# Patient Record
Sex: FEMALE | Race: WHITE | Employment: FULL TIME | URBAN - METROPOLITAN AREA
[De-identification: names, ages, dates, MRNs, and addresses within clinical notes are randomized per-mention and may not be internally consistent; named-entity substitution may affect disease eponyms.]

---

## 2019-05-10 ENCOUNTER — TRANSFERRED RECORDS (OUTPATIENT)
Dept: HEALTH INFORMATION MANAGEMENT | Facility: CLINIC | Age: 39
End: 2019-05-10

## 2020-01-22 ENCOUNTER — TRANSFERRED RECORDS (OUTPATIENT)
Dept: HEALTH INFORMATION MANAGEMENT | Facility: CLINIC | Age: 40
End: 2020-01-22

## 2020-02-04 ENCOUNTER — TRANSFERRED RECORDS (OUTPATIENT)
Dept: HEALTH INFORMATION MANAGEMENT | Facility: CLINIC | Age: 40
End: 2020-02-04

## 2020-02-24 ENCOUNTER — TRANSFERRED RECORDS (OUTPATIENT)
Dept: HEALTH INFORMATION MANAGEMENT | Facility: CLINIC | Age: 40
End: 2020-02-24

## 2020-03-26 ENCOUNTER — MEDICAL CORRESPONDENCE (OUTPATIENT)
Dept: HEALTH INFORMATION MANAGEMENT | Facility: CLINIC | Age: 40
End: 2020-03-26

## 2020-03-26 ENCOUNTER — TRANSCRIBE ORDERS (OUTPATIENT)
Dept: OTHER | Age: 40
End: 2020-03-26

## 2020-03-26 ENCOUNTER — TRANSFERRED RECORDS (OUTPATIENT)
Dept: HEALTH INFORMATION MANAGEMENT | Facility: CLINIC | Age: 40
End: 2020-03-26

## 2020-03-26 DIAGNOSIS — K75.4 AUTOIMMUNE HEPATITIS (H): Primary | ICD-10-CM

## 2020-04-03 NOTE — TELEPHONE ENCOUNTER
RECORDS RECEIVED FROM: Internal referral    DATE RECEIVED:7/3/20 10AM   NOTES STATUS DETAILS   OFFICE NOTE from referring provider Received  [facility] recs scanned in epic 3/26/20    OFFICE NOTES from other specialists N/A    DISCHARGE SUMMARY from hospital N/A    MEDICATION LIST Received Within OV notes   LIVER BIOSPY (IF APPLICABLE)      PATHOLOGY REPORTS  N/A    IMAGING     ENDOSCOPY (IF AVAILABLE) N/A    COLONOSCOPY (IF AVAILABLE) Received Colonoscopy 1/22/20   ULTRASOUND LIVER Received 5/10/19 - Fax to PN to have image pushed -4/3 8:54AM   CT OF ABDOMEN N/A    MRI OF LIVER N/A    FIBROSCAN, US ELASTOGRAPHY, FIBROSIS SCAN, MR ELASTOGRAPHY N/A    LABS     HEPATIC PANEL (LIVER PANEL) Received 2/24/20     Action 4.6.2020 MJ 7:18AM   Action Taken Received liver BX report from [facility]. Image in PACS.

## 2020-07-02 ENCOUNTER — TELEPHONE (OUTPATIENT)
Dept: GASTROENTEROLOGY | Facility: CLINIC | Age: 40
End: 2020-07-02

## 2020-07-03 ENCOUNTER — PRE VISIT (OUTPATIENT)
Dept: GASTROENTEROLOGY | Facility: CLINIC | Age: 40
End: 2020-07-03

## 2020-07-10 ENCOUNTER — VIRTUAL VISIT (OUTPATIENT)
Dept: GASTROENTEROLOGY | Facility: CLINIC | Age: 40
End: 2020-07-10
Attending: INTERNAL MEDICINE
Payer: COMMERCIAL

## 2020-07-10 DIAGNOSIS — K50.00 CROHN'S DISEASE OF SMALL INTESTINE WITHOUT COMPLICATION (H): ICD-10-CM

## 2020-07-10 DIAGNOSIS — K75.4 AUTOIMMUNE HEPATITIS (H): Primary | ICD-10-CM

## 2020-07-10 RX ORDER — MERCAPTOPURINE 50 MG/1
50 TABLET ORAL
COMMUNITY
Start: 2020-03-10 | End: 2020-08-21

## 2020-07-10 RX ORDER — MESALAMINE 800 MG/1
1600 TABLET, DELAYED RELEASE ORAL
COMMUNITY
Start: 2020-06-09 | End: 2021-06-09

## 2020-07-10 RX ORDER — MULTIPLE VITAMINS W/ MINERALS TAB 9MG-400MCG
1 TAB ORAL DAILY
COMMUNITY

## 2020-07-10 SDOH — HEALTH STABILITY: MENTAL HEALTH: HOW OFTEN DO YOU HAVE A DRINK CONTAINING ALCOHOL?: MONTHLY OR LESS

## 2020-07-10 ASSESSMENT — PAIN SCALES - GENERAL: PAINLEVEL: NO PAIN (0)

## 2020-07-10 NOTE — PROGRESS NOTES
"Angela Gifford is a 39 year old female who is being evaluated via a billable telephone visit.      The patient has been notified of following:     \"This telephone visit will be conducted via a call between you and your physician/provider. We have found that certain health care needs can be provided without the need for a physical exam.  This service lets us provide the care you need with a short phone conversation.  If a prescription is necessary we can send it directly to your pharmacy.  If lab work is needed we can place an order for that and you can then stop by our lab to have the test done at a later time.    Telephone visits are billed at different rates depending on your insurance coverage. During this emergency period, for some insurers they may be billed the same as an in-person visit.  Please reach out to your insurance provider with any questions.    If during the course of the call the physician/provider feels a telephone visit is not appropriate, you will not be charged for this service.\"    Patient has given verbal consent for Telephone visit?  Yes    What phone number would you like to be contacted at? [phone]    How would you like to obtain your AVS? Deedee    Phone call duration: *** minutes    {signature options:[id number]}        "

## 2020-07-10 NOTE — PROGRESS NOTES
"Angela Gifford is a 39 year old female who is being evaluated via a billable telephone visit during the COVID-19 pandemic and clinic response to limit in-person visits.  The patient has been notified of following:   \"This telephone visit will be conducted via a call between you and your physician/provider. We have found that certain health care needs can be provided without the need for a physical exam.  This service lets us provide the care you need with a short phone conversation.  If a prescription is necessary we can send it directly to your pharmacy.  If lab work is needed we can place an order for that and you can then stop by our lab to have the test done at a later time.  If during the course of the call the physician/provider feels a telephone visit is not appropriate, you will not be charged for this service.\" Telephone visits are billed at different rates depending on your insurance coverage. During this emergency period, for some insurers they may be billed the same as an in-person visit.  Please reach out to your insurance provider with any questions.  Consent has been obtained for this service by 1 care team member: yes  I have reviewed and updated the patient's Past Medical History, Social History, Family History and Medication List.    What phone number would you like to be contacted at?     Phone call contact time  Call Started at 8:12  Call Ended at 8:47  On phone patient    REASON FOR CONSULTATION: elevated liver tests  REFERRING PROVIDER: Dr. Mar Vicente    A/P  Angela Gifford is a 39 year old female with Crohns and diagnosis of AIH.    I think there is enough evidence to support the diagnosis of AIH (versus nonspecific inflammation in the face of IBD), but admittedly it is not completely clear given she was on 6MP at the time of her biopsy. She still had inflammation on her liver biopsy and her labs do not persistently normalize.    I would propose the following options  1. Increase " 6MP from 50 to 75  2. Change from 6MP to  bid.    Repeat LFTs ~ 2 weeks after medication change  I have called her gastroenterologist to discuss the medication changes.     Mari Donahue MD  Hepatology/Liver Transplant  Medical Director, Liver Transplantation  [facility]  Subjective  Angela Gifford is a 39 year old female referred for [facility]. She also has Crohn's. She has been seen at [facility] by Dr. Vicente  2/4/20. She is on mercaptupurine and mesalamine.    Liver tests 2852-5511 normal. 7/2018 ALT 71. Up to 192 7/12/19. Variably elevated since then, 2-4x normal. AST mildly (2x normal). AP, TB, protein normal    Liver biopsy 5/10/19 read by Dr. Mancuso  No fibrosis  Hepatitis of uncertain etiology   AST 51 at that time  She was on 6MP at the time    Crohns  Dx 2008, symptoms began 2006  Ileocolonic  Imuran was used and she had N//V  Maintained on pentasa/mesalamine (switched about 3-4 months ago to mesalamine) and 6-mercaptupurine (75 mg). In May 2019, decreased 6 MP 2/2 elevated liver tests to 50 mg. She stopped 6MP for a while and was on prednisone.    Stopping pentasa in the past resulted in arthralgias and aphthous ulcers. Liver enzymes were normal for about 3 months end of 2019. No change in medications at that time. She is currently in remission.     Had a child August 2014. Off 6MP December 2013-March 2015 then Crohns symptoms started (mouth sores, arthralgias)    HCV neg  HBV neg  FANNY 1:160  ASMA pos  AMA neg  Iron panel 2015 normal  US no record    PMH  TMJ  MARY ANNE in past. Hgb normal now 12.7  Crohns as above  Occasional eczema  No abdominal surgery  IUD in place  SHX    No tobacco  ETOH 3 glasses wine/week  SW in youth activities  FHX  No liver disease  M RA  MGM RA  ROS 10 point ROS neg other than the symptoms noted above in the HPI.

## 2020-07-10 NOTE — PROGRESS NOTES
"Angela Gifford is a 39 year old female who is being evaluated via a billable telephone visit during the COVID-19 pandemic and clinic response to limit in-person visits.  The patient has been notified of following:   \"This telephone visit will be conducted via a call between you and your physician/provider. We have found that certain health care needs can be provided without the need for a physical exam.  This service lets us provide the care you need with a short phone conversation.  If a prescription is necessary we can send it directly to your pharmacy.  If lab work is needed we can place an order for that and you can then stop by our lab to have the test done at a later time.  If during the course of the call the physician/provider feels a telephone visit is not appropriate, you will not be charged for this service.\" Telephone visits are billed at different rates depending on your insurance coverage. During this emergency period, for some insurers they may be billed the same as an in-person visit.  Please reach out to your insurance provider with any questions.  Consent has been obtained for this service by 1 care team member: yes  I have reviewed and updated the patient's Past Medical History, Social History, Family History and Medication List.    What phone number would you like to be contacted at?     Phone call contact time  Call Started at 8:12  Call Ended at 8:47  On phone patient      REASON FOR CONSULTATION: elevated liver tests  REFERRING PROVIDER:    A/P  Angela Gifford is a 39 year old female with Crohns and diagnosis of AIH.    I do think there is enough evidence         Mari Donahue MD  Hepatology/Liver Transplant  Medical Director, Liver Transplantation  [facility]  Subjective  Angela Gifford is a 39 year old female referred for AIH. She also has Crohn's. She has been seen at [facility] by Dr. Jules MANLEY 2/4/20. She is on mercaptupurine and mesalamine.    Liver tests " 7203-0647 normal. 7/2018 ALT 71. Up to 192 7/12/19. Variably elevated since then, 2-4x normal. AST mildly (2x normal). AP, TB, protein normal    Liver biopsy 5/10/19 read by Dr. Mancuso  No fibrosis  Hepatitis of uncertain etiology   AST 51 at that time  She was on 6MP at the time    Crohns  Dx 2008, symptoms began 2006  Ileocolonic  Imuran was used and she had N//V  Maintained on pentasa/mesalamine (switched about 3-4 months) and 6-mercaptupurine (75 mg) until May 2019, decreased 2/2 elevated liver tests to 50 mg. She stopped 6MP for a while and was on prednisone  Stopping pentasa resulted in arthralgias and aphthous ulcers. Liver enzymes were normal for about 3 months end of 2019. No change in medications at that time. She is currently in remission.     Had a child August 2014. Off 6MP December 2013-March 2015 then symptoms started (mouth sores, arthralgias)    HCV neg  HBV neg  FANNY 1:160  ASMA pos  AMA neg  Iron panel  US    PMH  TMJ  MARY ANNE  Crohns as above  Occasional eczema  No abdominal surgery  IUD   SHX    No tobacco  ETOH 3 glasses wine/week  SW in youth activities  FHX  No liver disease  M RA  MGM RA  ROS 10 point ROS neg other than the symptoms noted above in the HPI.

## 2020-07-15 ENCOUNTER — TELEPHONE (OUTPATIENT)
Dept: GASTROENTEROLOGY | Facility: CLINIC | Age: 40
End: 2020-07-15

## 2020-07-15 DIAGNOSIS — K75.4 AUTOIMMUNE HEPATITIS (H): Primary | ICD-10-CM

## 2020-07-15 RX ORDER — MYCOPHENOLATE MOFETIL 250 MG/1
750 CAPSULE ORAL 2 TIMES DAILY
Qty: 180 CAPSULE | Refills: 11 | Status: SHIPPED | OUTPATIENT
Start: 2020-07-15 | End: 2020-08-17

## 2020-07-15 NOTE — TELEPHONE ENCOUNTER
Called patient and discussed the following:    Our plan is   1. Stop 6 MP   2. Start mycophenolate mofetil (Cellcept) 750 mg po bid. This is a new prescription for her. I discussed this medication with her on the phone.   3. Labs in 2 weeks (BMP CBC LFTs)   4. See Dr. Vicente as planned in about 2 weeks.   5. Follow up with me in 6-8 weeks.    Pt verbalized understanding and had no further questions.    Guerda AZEEVDO LPN  Hepatology Clinic

## 2020-07-20 ENCOUNTER — TELEPHONE (OUTPATIENT)
Dept: GASTROENTEROLOGY | Facility: CLINIC | Age: 40
End: 2020-07-20

## 2020-08-17 DIAGNOSIS — K75.4 AUTOIMMUNE HEPATITIS (H): ICD-10-CM

## 2020-08-17 RX ORDER — MYCOPHENOLATE MOFETIL 250 MG/1
750 CAPSULE ORAL 2 TIMES DAILY
Qty: 540 CAPSULE | Refills: 3 | Status: SHIPPED | OUTPATIENT
Start: 2020-08-17 | End: 2020-12-15

## 2020-08-21 ENCOUNTER — VIRTUAL VISIT (OUTPATIENT)
Dept: GASTROENTEROLOGY | Facility: CLINIC | Age: 40
End: 2020-08-21
Attending: INTERNAL MEDICINE
Payer: COMMERCIAL

## 2020-08-21 DIAGNOSIS — K75.4 AUTOIMMUNE HEPATITIS (H): ICD-10-CM

## 2020-08-21 ASSESSMENT — PAIN SCALES - GENERAL: PAINLEVEL: NO PAIN (0)

## 2020-08-21 NOTE — PROGRESS NOTES
"Angela Gifford is a 39 year old female who is being evaluated via a billable video visit.      The patient has been notified of following:   \"This video visit will be conducted via a call between you and your physician/provider. We have found that certain health care needs can be provided without the need for an in-person physical exam.  This service lets us provide the care you need with a video conversation.  If a prescription is necessary we can send it directly to your pharmacy.  If lab work is needed we can place an order for that and you can then stop by our lab to have the test done at a later time. Video visits are billed at different rates depending on your insurance coverage.  Please reach out to your insurance provider with any questions.  If during the course of the call the physician/provider feels a video visit is not appropriate, you will not be charged for this service.\"   Patient has given verbal consent for Video visit? Yes      Type of service:  Video Visit  Video Start Time: 11:35  Video End Time 11:47  Patient location: home  Will anyone else be joining your video visit?   {If patient encounters technical issues they should call [phone] :[phone]  Distant Location (provider location):  [facility] HEPATOLOGY   Mode of Communication:  Video Conference via [place]  I have reviewed and updated the patient's Past Medical History, Social History, Family History and Medication List.    REASON FOR CONSULTATION: elevated liver tests  REFERRING PROVIDER: Dr. Mar Vicente    A/P  Angela Gifford is a 39 year old female with Crohns and diagnosis of AIH.    I think there is enough evidence to support the diagnosis of AIH (versus nonspecific inflammation in the face of IBD), but admittedly it is not completely clear given she was on 6MP at the time of her biopsy. She still had inflammation on her liver biopsy and her labs do not persistently normalize. Had N/V with Imuran. Switched off " mercaptopurine to MMF about 1 month ago. LFTs currently normal.   Plan is to possibly start Humira per Dr. Vicente.  Eventually I would like to try to reduce the MMF but will wait for things to settle out with her Crohn's medication plans. MMF is not a primary medication for Crohns.  VIt D Stopped vit D supplement   Liver tests will be monitored by Dr. Vicente. Angela knows to contact e if LFTs become abnormal.  RTC 6 months.      Mari Donahue MD  Hepatology/Liver Transplant  Medical Director, Liver Transplantation  [facility]  Subjective  Angela Gifford is a 39 year old female referred for [facility]. She also has Crohn's. She has been seen at [facility] by Dr. Vicente . She was on mercaptupurine and mesalamine.    I talked to Dr. Vicente and we agreed to stop mercaptopurine and start  bid. F. She is tolerating this well. Liver tests are now normal.    Liver tests 1438-6448 normal. 7/2018 ALT 71. Up to 192 7/12/19. Variably elevated since then, 2-4x normal. AST mildly (2x normal). AP, TB, protein normal    Liver biopsy 5/10/19 read by Dr. Mancuso  No fibrosis  Hepatitis of uncertain etiology   AST 51 at that time  She was on 6MP at the time    Crohns  Dx 2008, symptoms began 2006  Ileocolonic  Imuran was used and she had N//V  Maintained on pentasa/mesalamine (switched about 3-4 months ago to mesalamine) and 6-mercaptupurine (75 mg). In May 2019, decreased 6 MP 2/2 elevated liver tests to 50 mg. She stopped 6MP for a while and was on prednisone.    Stopping pentasa in the past resulted in arthralgias and aphthous ulcers. Liver enzymes were normal for about 3 months end of 2019. No change in medications at that time. She is currently in remission.     Had a child August 2014. Off 6MP December 2013-March 2015 then Crohns symptoms started (mouth sores, arthralgias)    HCV neg  HBV neg  FANNY 1:160  ASMA pos  AMA neg  Iron panel 2015 normal  US no record    PMH  TMJ  MARY ANNE in past. Hgb normal  now 12.7  Crohns as above  Occasional eczema  No abdominal surgery  IUD in place  SHX    No tobacco  ETOH 3 glasses wine/week  SW in youth activities  FHX  No liver disease  M RA  MGM RA  ROS 10 point ROS neg other than the symptoms noted above in the HPI.

## 2020-08-21 NOTE — PROGRESS NOTES
"Angela Gifford is a 39 year old female who is being evaluated via a billable video visit.      The patient has been notified of following:     \"This video visit will be conducted via a call between you and your physician/provider. We have found that certain health care needs can be provided without the need for an in-person physical exam.  This service lets us provide the care you need with a video conversation.  If a prescription is necessary we can send it directly to your pharmacy.  If lab work is needed we can place an order for that and you can then stop by our lab to have the test done at a later time.    Video visits are billed at different rates depending on your insurance coverage.  Please reach out to your insurance provider with any questions.    If during the course of the call the physician/provider feels a video visit is not appropriate, you will not be charged for this service.\"    Patient has given verbal consent for Video visit? Yes  How would you like to obtain your AVS? MyChart  If you are dropped from the video visit, the video invite should be resent to: Text to cell phone: [phone]  Will anyone else be joining your video visit? No  {If patient encounters technical issues they should call [phone] :[id number]}      Video-Visit Details    Type of service:  Video Visit    Video Start Time: {video visit start/end time:152948}  Video End Time: {video visit start/end time:152948}    Originating Location (pt. Location): {video visit patient location:843346::\"Home\"}    Distant Location (provider location):  [facility] HEPATOLOGY     Platform used for Video Visit: {Virtual Visit Platforms:515693::\"Beacon Reader\"}    {signature options:381497}        "

## 2020-08-21 NOTE — LETTER
"    8/21/2020         RE: nAgela Gifford  [address] [place] [id number]        Dear Colleague,    Thank you for referring your patient, Angela Gifford, to the [facility] HEPATOLOGY. Please see a copy of my visit note below.    Angela Gifford is a 39 year old female who is being evaluated via a billable video visit.      The patient has been notified of following:   \"This video visit will be conducted via a call between you and your physician/provider. We have found that certain health care needs can be provided without the need for an in-person physical exam.  This service lets us provide the care you need with a video conversation.  If a prescription is necessary we can send it directly to your pharmacy.  If lab work is needed we can place an order for that and you can then stop by our lab to have the test done at a later time. Video visits are billed at different rates depending on your insurance coverage.  Please reach out to your insurance provider with any questions.  If during the course of the call the physician/provider feels a video visit is not appropriate, you will not be charged for this service.\"   Patient has given verbal consent for Video visit? Yes      Type of service:  Video Visit  Video Start Time: 11:35  Video End Time 11:47  Patient location: home  Will anyone else be joining your video visit?   {If patient encounters technical issues they should call [phone] :[phone]  Distant Location (provider location):  [facility] HEPATOLOGY   Mode of Communication:  Video Conference via Cinchcast  I have reviewed and updated the patient's Past Medical History, Social History, Family History and Medication List.    REASON FOR CONSULTATION: elevated liver tests  REFERRING PROVIDER: Dr. Mar Vicente    A/P  Angela Gifford is a 39 year old female with Crohns and diagnosis of AIH.    I think there is enough evidence to support the diagnosis of AIH (versus nonspecific inflammation in the " face of IBD), but admittedly it is not completely clear given she was on 6MP at the time of her biopsy. She still had inflammation on her liver biopsy and her labs do not persistently normalize. Had N/V with Imuran. Switched off mercaptopurine to MMF about 1 month ago. LFTs currently normal.   Plan is to possibly start Humira per Dr. Vicente.  Eventually I would like to try to reduce the MMF but will wait for things to settle out with her Crohn's medication plans. MMF is not a primary medication for Crohns.  VIt D Stopped vit D supplement   Liver tests will be monitored by Dr. Vicente. Angela knows to contact e if LFTs become abnormal.  RTC 6 months.      Mari Donahue MD  Hepatology/Liver Transplant  Medical Director, Liver Transplantation  [facility]  Subjective  Angela Gifford is a 39 year [facility]. She also has Crohn's. She has been seen at [facility] by Dr. Vicente . She was on mercaptupurine and mesalamine.    I talked to Dr. Vicente and we agreed to stop mercaptopurine and start  bid. F. She is tolerating this well. Liver tests are now normal.    Liver tests 8385-8557 normal. 7/2018 ALT 71. Up to 192 7/12/19. Variably elevated since then, 2-4x normal. AST mildly (2x normal). AP, TB, protein normal    Liver biopsy 5/10/19 read by Dr. Mancuso  No fibrosis  Hepatitis of uncertain etiology   AST 51 at that time  She was on 6MP at the time    Crohns  Dx 2008, symptoms began 2006  Ileocolonic  Imuran was used and she had N//V  Maintained on pentasa/mesalamine (switched about 3-4 months ago to mesalamine) and 6-mercaptupurine (75 mg). In May 2019, decreased 6 MP 2/2 elevated liver tests to 50 mg. She stopped 6MP for a while and was on prednisone.    Stopping pentasa in the past resulted in arthralgias and aphthous ulcers. Liver enzymes were normal for about 3 months end of 2019. No change in medications at that time. She is currently in remission.     Had a  child August 2014. Off 6MP December 2013-March 2015 then Crohns symptoms started (mouth sores, arthralgias)    HCV neg  HBV neg  FANNY 1:160  ASMA pos  AMA neg  Iron panel 2015 normal  US no record    PMH  TMJ  MARY ANNE in past. Hgb normal now 12.7  Crohns as above  Occasional eczema  No abdominal surgery  IUD in place  SHX    No tobacco  ETOH 3 glasses wine/week  SW in youth activities  FHX  No liver disease  M RA  MGM RA  ROS 10 point ROS neg other than the symptoms noted above in the HPI.    Again, thank you for allowing me to participate in the care of your patient.        Sincerely,        Mari Donahue MD

## 2020-12-15 DIAGNOSIS — K75.4 AUTOIMMUNE HEPATITIS (H): ICD-10-CM

## 2020-12-15 RX ORDER — MYCOPHENOLATE MOFETIL 250 MG/1
750 CAPSULE ORAL 2 TIMES DAILY
Qty: 180 CAPSULE | Refills: 11 | Status: SHIPPED | OUTPATIENT
Start: 2020-12-15

## 2020-12-15 NOTE — TELEPHONE ENCOUNTER
30 day supply refill sent.  Patient notified.    Guerda AZEVEDO LPN  Hepatology Clinic    [facility] Call Center    Phone Message    May a detailed message be left on voicemail: yes     Reason for Call: Medication Refill Request    Has the patient contacted the pharmacy for the refill? Yes   Name of medication being requested: mycophenolate (GENERIC EQUIVALENT) 250 MG capsule  Provider who prescribed the medication: Dr. Donahue  Pharmacy: [id number] [address] [place] [address] AT [facility] [address]  Date medication is needed: RANJIT Miller calling to request a short supply to  locally due to her mail order pharmacy's shipment not being delivered to her. They are going to send her a new supply. She is currently out of this medication and does not have any to take for her pm dose. Please call Angela to confirm.    Action Taken: Message routed to:  [facility]  HEPATOLOGY    Travel Screening: Not Applicable

## 2021-01-04 ENCOUNTER — HEALTH MAINTENANCE LETTER (OUTPATIENT)
Age: 41
End: 2021-01-04

## 2021-02-12 ENCOUNTER — TELEPHONE (OUTPATIENT)
Dept: GASTROENTEROLOGY | Facility: CLINIC | Age: 41
End: 2021-02-12

## 2021-02-12 DIAGNOSIS — K75.4 AUTOIMMUNE HEPATITIS (H): Primary | ICD-10-CM

## 2021-02-12 NOTE — TELEPHONE ENCOUNTER
Lab orders faxed to [phone]. Pt notified.    Guerda AZEVEDO LPN  Hepatology Clinic       [facility] Call Center    Phone Message    May a detailed message be left on voicemail: yes     Reason for Call: Order(s): Other:   Reason for requested: Lab order for appointment on 2/22/21.  [facility] - [phone]  Date needed: ASAP  Provider name: Dr. Mari Donahue      Action Taken: Message routed to:  [facility] Hepatology    Travel Screening: Not Applicable

## 2021-02-21 NOTE — PROGRESS NOTES
[facility]  LIVER CLINIC FOLLOW UP  VIDEO VISIT    REASON FOR CONSULTATION: elevated liver tests  REFERRING PROVIDER: Dr. Mar Vicente    A/P  Ms. Gifford is a 39 Y F with Crohns and AIH.    I think there is enough evidence to support the diagnosis of AIH (versus nonspecific inflammation in the face of IBD), but admittedly it is not completely clear. She still had inflammation on her liver biopsy and her labs did not persistently normalize. Had N/V with Imuran. Switched off mercaptopurine to MMF July 2020. LFTs normal since then    She is on Humira for Crohn's since September 2020. MMF is not a primary medication for Crohns.  When she sees Dr. Vicente in May, if things are stable, ok to reduce MMF to 500 bid. She should call me then as I would want more LFTs with a dose reduction.   Liver tests will be monitored by Dr. Vicente. Angela knows to contact me if LFTs become abnormal.  RTC 12 months.    Mari Donahue MD  Hepatology/Liver Transplant  Medical Director, Liver Transplantation  [facility]  Subjective  Angela Gifford is a 40 Y F referred for AIH. She also has Crohn's. She has been seen at [facility] by Dr. Vicente.  She was on mercaptupurine and mesalamine in the past and switched to Humira and MMF July 2020.    I talked to Dr. Vicente and we agreed to stop mercaptopurine and start  bid. She is tolerating this well. Liver tests are now normal since July 2020.    Liver tests  3812-5288 normal  7/2018 ALT 71  7/12/19    7742-9723 Variably elevated, 2-4x normal. AP, TB,  TP nl  7/2020-current LFTs in teens    Liver biopsy 5/10/19 read by Dr. Mancuso  No fibrosis  Hepatitis of uncertain etiology   AST 51 at that time  She was on 6MP at the time    Crohns  Dx 2008, symptoms began 2006  Ileocolonic  Imuran was used and she had N//V  Maintained on pentasa/mesalamine (switched about 3-4 months ago to mesalamine) and 6-mercaptupurine (75 mg). In May 2019,  "decreased 6 MP 2/2 elevated liver tests to 50 mg. She stopped 6MP for a while and was on prednisone.    Stopping pentasa in the past resulted in arthralgias and aphthous ulcers. Liver enzymes were normal for about 3 months end of 2019. No change in medications at that time.     Had a child August 2014. Off 6MP December 2013-March 2015 then Crohns symptoms started (mouth sores, arthralgias)    HCV neg  HBV neg  FANNY 1:160  ASMA pos  AMA neg  Iron panel 2015 normal  US no record    PMH  TMJ  MARY ANNE in past. Hgb normal now 12.7  Crohns as above  Occasional eczema  No abdominal surgery  IUD in place  SHX    No tobacco  ETOH 3 glasses wine/week  SW in youth activities  FHX  No liver disease  M RA  MGM RA  ROS 10 point ROS neg other than the symptoms noted above in the HPI.    Exam  Gen Alert pleasant NAD  Resp No difficulty breathing. No cough  Skin No Jaundice  Eyes No icterus  Neuro STACK  MSK no muscle wasting  Psyche Pleasant, appropriate. Well groomed.      Angela Gifford is a 40 year old female who is being evaluated via a billable video visit.      The patient has been notified of following:   \"This video visit will be conducted via a call between you and your physician/provider. We have found that certain health care needs can be provided without the need for an in-person physical exam.  This service lets us provide the care you need with a video conversation.  If a prescription is necessary we can send it directly to your pharmacy.  If lab work is needed we can place an order for that and you can then stop by our lab to have the test done at a later time. Video visits are billed at different rates depending on your insurance coverage.  Please reach out to your insurance provider with any questions.  If during the course of the call the physician/provider feels a video visit is not appropriate, you will not be charged for this service.\"   Patient has given verbal consent for Video visit? Yes      Type of " service:  Video Visit  Video Start Time: 911  Video End Time 931  Patient location: home  Will anyone else be joining your video visit? No  {If patient encounters technical issues they should call [phone] :1  Distant Location (provider location):  [facility] HEPATOLOGY CLINIC [place]   Mode of Communication:  Video Conference via [employer]  I have reviewed and updated the patient's Past Medical History, Social History, Family History and Medication List.

## 2021-02-22 ENCOUNTER — VIRTUAL VISIT (OUTPATIENT)
Dept: GASTROENTEROLOGY | Facility: CLINIC | Age: 41
End: 2021-02-22
Attending: INTERNAL MEDICINE
Payer: COMMERCIAL

## 2021-02-22 DIAGNOSIS — K75.4 AUTOIMMUNE HEPATITIS (H): Primary | ICD-10-CM

## 2021-02-22 PROCEDURE — 99213 OFFICE O/P EST LOW 20 MIN: CPT | Mod: GT | Performed by: INTERNAL MEDICINE

## 2021-02-22 RX ORDER — ADALIMUMAB 40MG/0.4ML
KIT SUBCUTANEOUS
COMMUNITY
Start: 2021-02-02

## 2021-02-22 RX ORDER — MYCOPHENOLATE MOFETIL 500 MG/1
TABLET ORAL
Qty: 180 TABLET | Refills: 3 | Status: SHIPPED | OUTPATIENT
Start: 2021-02-22

## 2021-02-22 ASSESSMENT — PAIN SCALES - GENERAL: PAINLEVEL: NO PAIN (0)

## 2021-02-22 NOTE — PROGRESS NOTES
"Angela is a 40 year old who is being evaluated via a billable video visit.      How would you like to obtain your AVS? MyChart  If the video visit is dropped, the invitation should be resent by: Send to e-mail at: [email or web address]  Will anyone else be joining your video visit? No  {If patient encounters technical issues they should call [phone] :[id number]}    Video Start Time: {video visit start/end time for provider to select:152948}  Video-Visit Details    Type of service:  Video Visit    Video End Time:{video visit start/end time for provider to select:705148}    Originating Location (pt. Location): {video visit patient location:238150::\"Home\"}    Distant Location (provider location):  [facility] HEPATOLOGY CLINIC [place]     Platform used for Video Visit: {Virtual Visit Platforms:864910::\"eMeterWell\"}  "

## 2021-02-22 NOTE — LETTER
2/22/2021         RE: Angela Gifford  [address]  [place] [place] [address]        Dear Colleague,    Thank you for referring your patient, Angela Gifford, to the [facility]PAT[facility] [place]. Please see a copy of my visit note below.    [facility]  LIVER CLINIC FOLLOW UP  VIDEO VISIT    REASON FOR CONSULTATION: elevated liver tests  REFERRING PROVIDER: Dr. Mar Vicente    A/P  Ms. Gifford is a 39 Y F with Crohns and AIH.    I think there is enough evidence to support the diagnosis of AIH (versus nonspecific inflammation in the face of IBD), but admittedly it is not completely clear. She still had inflammation on her liver biopsy and her labs did not persistently normalize. Had N/V with Imuran. Switched off mercaptopurine to MMF July 2020. LFTs normal since then    She is on Humira for Crohn's since September 2020. MMF is not a primary medication for Crohns.  When she sees Dr. Vicente in May, if things are stable, ok to reduce MMF to 500 bid. She should call me then as I would want more LFTs with a dose reduction.   Liver tests will be monitored by Dr. Vicente. Angela knows to contact me if LFTs become abnormal.  RTC 12 months.    Mari Donahue MD  Hepatology/Liver Transplant  Medical Director, Liver Transplantation  [facility]  Subjective  Angela Gifford is a 40 Y F referred for AIH. She also has Crohn's. She has been seen at [facility] by Dr. Vicente.  She was on mercaptupurine and mesalamine in the past and switched to Humira and MMF July 2020.    I talked to Dr. Vicente and we agreed to stop mercaptopurine and start  bid. She is tolerating this well. Liver tests are now normal since July 2020.    Liver tests  8787-6472 normal  7/2018 ALT 71  7/12/19    9575-0984 Variably elevated, 2-4x normal. AP, TB,  TP nl  7/2020-current LFTs in teens    Liver biopsy 5/10/19 read by Dr. Mancuso  No fibrosis  Hepatitis of uncertain etiology  ALT  "122 AST 51 at that time  She was on 6MP at the time    Crohns  Dx 2008, symptoms began 2006  Ileocolonic  Imuran was used and she had N//V  Maintained on pentasa/mesalamine (switched about 3-4 months ago to mesalamine) and 6-mercaptupurine (75 mg). In May 2019, decreased 6 MP 2/2 elevated liver tests to 50 mg. She stopped 6MP for a while and was on prednisone.    Stopping pentasa in the past resulted in arthralgias and aphthous ulcers. Liver enzymes were normal for about 3 months end of 2019. No change in medications at that time.     Had a child August 2014. Off 6MP December 2013-March 2015 then Crohns symptoms started (mouth sores, arthralgias)    HCV neg  HBV neg  FANNY 1:160  ASMA pos  AMA neg  Iron panel 2015 normal  US no record    PMH  TMJ  MARY ANNE in past. Hgb normal now 12.7  Crohns as above  Occasional eczema  No abdominal surgery  IUD in place  SHX    No tobacco  ETOH 3 glasses wine/week  SW in youth activities  FHX  No liver disease  M RA  MGM RA  ROS 10 point ROS neg other than the symptoms noted above in the HPI.    Exam  Gen Alert pleasant NAD  Resp No difficulty breathing. No cough  Skin No Jaundice  Eyes No icterus  Neuro STACK  MSK no muscle wasting  Psyche Pleasant, appropriate. Well groomed.      Angela Gifford is a 40 year old female who is being evaluated via a billable video visit.      The patient has been notified of following:   \"This video visit will be conducted via a call between you and your physician/provider. We have found that certain health care needs can be provided without the need for an in-person physical exam.  This service lets us provide the care you need with a video conversation.  If a prescription is necessary we can send it directly to your pharmacy.  If lab work is needed we can place an order for that and you can then stop by our lab to have the test done at a later time. Video visits are billed at different rates depending on your insurance coverage.  Please reach " "out to your insurance provider with any questions.  If during the course of the call the physician/provider feels a video visit is not appropriate, you will not be charged for this service.\"   Patient has given verbal consent for Video visit? Yes      Type of service:  Video Visit  Video Start Time: 911  Video End Time 931  Patient location: home  Will anyone else be joining your video visit? No  {If patient encounters technical issues they should call [phone] :1  Distant Location (provider location):  [facility] HEPATOLOGY CLINIC [place]   Mode of Communication:  Video Conference via [employer]  I have reviewed and updated the patient's Past Medical History, Social History, Family History and Medication List.        Again, thank you for allowing me to participate in the care of your patient.        Sincerely,        Mari Donahue MD    "

## 2021-10-10 ENCOUNTER — HEALTH MAINTENANCE LETTER (OUTPATIENT)
Age: 41
End: 2021-10-10

## 2022-01-30 ENCOUNTER — HEALTH MAINTENANCE LETTER (OUTPATIENT)
Age: 42
End: 2022-01-30

## 2022-09-24 ENCOUNTER — HEALTH MAINTENANCE LETTER (OUTPATIENT)
Age: 42
End: 2022-09-24

## 2023-05-08 ENCOUNTER — HEALTH MAINTENANCE LETTER (OUTPATIENT)
Age: 43
End: 2023-05-08

## 2024-02-25 ENCOUNTER — HEALTH MAINTENANCE LETTER (OUTPATIENT)
Age: 44
End: 2024-02-25